# Patient Record
Sex: MALE | Race: WHITE
[De-identification: names, ages, dates, MRNs, and addresses within clinical notes are randomized per-mention and may not be internally consistent; named-entity substitution may affect disease eponyms.]

---

## 2017-04-08 ENCOUNTER — HOSPITAL ENCOUNTER (EMERGENCY)
Dept: HOSPITAL 80 - FED | Age: 57
Discharge: HOME | End: 2017-04-08
Payer: MEDICAID

## 2017-04-08 VITALS — HEART RATE: 96 BPM | RESPIRATION RATE: 18 BRPM | DIASTOLIC BLOOD PRESSURE: 90 MMHG | SYSTOLIC BLOOD PRESSURE: 130 MMHG

## 2017-04-08 VITALS — TEMPERATURE: 98.8 F | OXYGEN SATURATION: 95 %

## 2017-04-08 DIAGNOSIS — J02.9: Primary | ICD-10-CM

## 2017-04-08 DIAGNOSIS — F17.200: ICD-10-CM

## 2017-04-08 NOTE — EDPHY
H & P


Time Seen by Provider: 17 13:42





- Personal History


Tetanus Vaccine Date: < 10 YEARS





- Medical/Surgical History


Hx Asthma: No


Hx Chronic Respiratory Disease: No


Hx Diabetes: No


Hx Cardiac Disease: No


Hx Renal Disease: No


Hx Cirrhosis: No


Hx Alcoholism: Yes


Hx HIV/AIDS: No


Hx Splenectomy or Spleen Trauma: No


Other PMH: FACIAL SURGERY FRO RECONSTRUCTION.  ETOH, Schizophrenia, IVDA-

METHAMPHETAMINE





- Social History


Smoking Status: Current every day smoker


Constitutional: 


 Initial Vital Signs











Temperature (C)  37.1 C   17 13:47


 


Heart Rate  105 H  17 13:47


 


Respiratory Rate  20   17 13:47


 


Blood Pressure  136/122 H  17 13:47


 


O2 Sat (%)  95   17 13:47








 











O2 Delivery Mode               Room Air














Allergies/Adverse Reactions: 


 





No Known Allergies Allergy (Verified 16 13:47)


 








Home Medications: 














 Medication  Instructions  Recorded


 


Penicillin V Potassium [Pen Vk 500 mg PO Q6H 10 Days 17





500mg (*)]  














Medical Decision Making


ED Course/Re-evaluation: 





CHIEF COMPLAINT:  Cough, palpitations. 





HISTORY OF PRESENT ILLNESS: The patient is a 56-year-old male who presents via 

EMS with palpitations and cough. He reports that he has had these palpitations 

since he was age 20 and they are not different in any way today. He has also 

been complaining of productive cough. He denies fever, chills, vomiting, nausea

, abdominal pain. He smoked meth 2 hours ago and has had a pint of whiskey. 





REVIEW OF SYSTEMS:  





A 10 point review of systems was performed and is negative with the exception 

of the elements mentioned in the history of present illness.





PHYSICAL EXAM:  





HR, BP, O2 Sat, RR.  Temp noted


General Appearance:  Alert, well hydrated, appropriate, and non-toxic appearing.


Head:  Atraumatic without scalp tenderness or obvious injury


Eyes:  Pupils equal, round, reactive to light and accommodation, EOMI, no trauma

, no injection.


Ears:  Clear bilaterally, no perforation, normal landmarks


Nose:  Atraumatic, no rhinorrhea, clear.


Throat:  There are no exudates, no lesions, normal tonsils, mucus membranes 

moist. Pharyngeal erythema. 


Neck:  Supple, 2+ carotid upstroke, nontender, no lymphadenopathy.


Respiratory:  No retractions, no distress, no wheezes, and no accessory muscle 

use.  Lungs are clear to auscultation bilaterally.


Cardiovascular:  Regular rate and rhythm, no murmurs, rubs, or gallops. 

Bilateral carotid, radial, dorsalis pedis, and posterior tibial pulses intact. 

Good capillary refill all extremities.


Gastrointestinal:  Abdomen is soft, nontender, non-distended, no masses, no 

rebound, no guarding, no peritoneal signs.


Musculoskeletal:  Normal active ROM of all extremities, atraumatic.


Neurological:  Alert, appropriate, and interactive.  The patient has normal 

DTRs and non-focal cranial nerves, motor, sensory, and cerebellar exam.


Skin:  No rashes, good turgor, no nodules on palpation.





Past medical history: Schizophrenia.


Past surgical history: N/A. 


Family history: N/A.


Social history: Alcohol abuse, methamphetamine abuse. 





DIFFERENTIAL DIAGNOSIS:   


The differential diagnosis includes, but is not limited to: pharyngitis, 

laryngitis, viral syndrome, bronchitis, pneumonia. 





MEDICAL DECISION MAKIN-year-old homeless alcoholic male presents via EMS for cough and 

palpitations. He has had these palpitations since age 20 and they are not 

different in any way today. He does not have chest pain or shortness of breath. 

He is complaining of productive cough that he has had for the past month and 

sore throat. On exam he has pharyngeal erythema and has an obvious pharyngitis. 

I will treat him with 10 days BID 500mg Pen Vk. He is comfortable with the 

plan. He does not need labs or EKG at this time. 





Departure





- Departure


Disposition: Home, Routine, Self-Care


Clinical Impression: 


Pharyngitis


Qualifiers:


 Pharyngitis/tonsillitis etiology: unspecified etiology Qualified Code(s): 

J02.9 - Acute pharyngitis, unspecified





Condition: Good


Instructions:  Pharyngitis (ED)


Additional Instructions: 


Take the antibiotic as prescribed. 





Drink plenty of fluids and be sure to get rest. 





Return for any serious worsening of condition. 


Referrals: 


Patient,NotPresent [Primary Care Provider] - As per Instructions


[facility],. [Clinic] - As per Instructions


Prescriptions: 


Penicillin V Potassium [Pen Vk 500mg (*)] 500 mg PO Q6H 10 Days


Report Scribed for: Xander Ramírez


Report Scribed by: Josue Therhwanger


Date of Report: 17


Time of Report: 13:45

## 2017-04-17 ENCOUNTER — HOSPITAL ENCOUNTER (EMERGENCY)
Dept: HOSPITAL 80 - FED | Age: 57
Discharge: LEFT BEFORE DISCHARGE | End: 2017-04-17
Payer: MEDICAID

## 2017-04-17 VITALS
DIASTOLIC BLOOD PRESSURE: 92 MMHG | HEART RATE: 83 BPM | OXYGEN SATURATION: 92 % | SYSTOLIC BLOOD PRESSURE: 107 MMHG | RESPIRATION RATE: 16 BRPM | TEMPERATURE: 98.1 F

## 2017-04-17 DIAGNOSIS — Z53.21: Primary | ICD-10-CM

## 2018-09-28 ENCOUNTER — HOSPITAL ENCOUNTER (EMERGENCY)
Dept: HOSPITAL 80 - FED | Age: 58
Discharge: HOME | End: 2018-09-28
Payer: MEDICAID

## 2018-09-28 VITALS — DIASTOLIC BLOOD PRESSURE: 68 MMHG | SYSTOLIC BLOOD PRESSURE: 107 MMHG
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
## 2018-09-28 DIAGNOSIS — F17.200: ICD-10-CM
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
## 2018-09-28 DIAGNOSIS — S01.01XA: ICD-10-CM

## 2018-09-28 DIAGNOSIS — S01.312A: Primary | ICD-10-CM

## 2018-09-28 PROCEDURE — 09Q1XZZ REPAIR LEFT EXTERNAL EAR, EXTERNAL APPROACH: ICD-10-PCS
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
